# Patient Record
Sex: FEMALE | Race: WHITE
[De-identification: names, ages, dates, MRNs, and addresses within clinical notes are randomized per-mention and may not be internally consistent; named-entity substitution may affect disease eponyms.]

---
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
## 2019-03-25 ENCOUNTER — HOSPITAL ENCOUNTER (EMERGENCY)
Dept: HOSPITAL 74 - JER | Age: 19
Discharge: HOME | End: 2019-03-25
Payer: COMMERCIAL

## 2019-03-25 VITALS — SYSTOLIC BLOOD PRESSURE: 112 MMHG | DIASTOLIC BLOOD PRESSURE: 85 MMHG | HEART RATE: 106 BPM | TEMPERATURE: 98.1 F

## 2019-03-25 VITALS — BODY MASS INDEX: 30.4 KG/M2

## 2019-03-25 DIAGNOSIS — O23.42: ICD-10-CM

## 2019-03-25 DIAGNOSIS — O26.892: Primary | ICD-10-CM

## 2019-03-25 DIAGNOSIS — Z3A.15: ICD-10-CM

## 2019-03-25 LAB
ALBUMIN SERPL-MCNC: 3.2 G/DL (ref 3.4–5)
ALP SERPL-CCNC: 71 U/L (ref 45–117)
ALT SERPL-CCNC: 14 U/L (ref 13–61)
ANION GAP SERPL CALC-SCNC: 7 MMOL/L (ref 8–16)
APPEARANCE UR: (no result)
APTT BLD: 31.9 SECONDS (ref 25.2–36.5)
AST SERPL-CCNC: 11 U/L (ref 15–37)
BACTERIA #/AREA URNS HPF: 236.56 /HPF
BILIRUB SERPL-MCNC: 0.2 MG/DL (ref 0.2–1)
BILIRUB UR STRIP.AUTO-MCNC: NEGATIVE MG/DL
BUN SERPL-MCNC: 8 MG/DL (ref 7–18)
CALCIUM SERPL-MCNC: 8.5 MG/DL (ref 8.5–10.1)
CASTS #/AREA URNS LPF: 3 /HPF (ref 0–8)
CHLORIDE SERPL-SCNC: 108 MMOL/L (ref 98–107)
CO2 SERPL-SCNC: 22 MMOL/L (ref 21–32)
COLOR UR: YELLOW
CREAT SERPL-MCNC: 0.4 MG/DL (ref 0.55–1.3)
DEPRECATED RDW RBC AUTO: 13.3 % (ref 11.6–15.6)
EPITH CASTS URNS QL MICRO: 3.2 /HPF (ref 0–5)
GLUCOSE SERPL-MCNC: 82 MG/DL (ref 74–106)
HCT VFR BLD CALC: 35.4 % (ref 32.4–45.2)
HGB BLD-MCNC: 12.5 GM/DL (ref 10.7–15.3)
INR BLD: 1.03 (ref 0.83–1.09)
KETONES UR QL STRIP: (no result)
LEUKOCYTE ESTERASE UR QL STRIP.AUTO: (no result)
MCH RBC QN AUTO: 31.6 PG (ref 25.7–33.7)
MCHC RBC AUTO-ENTMCNC: 35.5 G/DL (ref 32–36)
MCV RBC: 89.2 FL (ref 80–96)
NITRITE UR QL STRIP: NEGATIVE
PH UR: 7 [PH] (ref 5–8)
PLATELET # BLD AUTO: 258 K/MM3 (ref 134–434)
PMV BLD: 7.8 FL (ref 7.5–11.1)
POTASSIUM SERPLBLD-SCNC: 3.8 MMOL/L (ref 3.5–5.1)
PROT SERPL-MCNC: 6.6 G/DL (ref 6.4–8.2)
PROT UR QL STRIP: NEGATIVE
PROT UR QL STRIP: NEGATIVE
PT PNL PPP: 12.2 SEC (ref 9.7–13)
RBC # BLD AUTO: 1 /HPF (ref 0–4)
RBC # BLD AUTO: 3.97 M/MM3 (ref 3.6–5.2)
SODIUM SERPL-SCNC: 137 MMOL/L (ref 136–145)
SP GR UR: 1.03 (ref 1.01–1.03)
UROBILINOGEN UR STRIP-MCNC: 1 MG/DL (ref 0.2–1)
WBC # BLD AUTO: 8.8 K/MM3 (ref 4–10)
WBC # UR AUTO: 5 /HPF (ref 0–5)

## 2019-03-25 NOTE — PDOC
History of Present Illness





- General


Chief Complaint: Pain


Stated Complaint: PAIN 16 WKS PREGNANT


Time Seen by Provider: 19 13:13





- History of Present Illness


Initial Comments: 





The pt is a 18  @ 15w5d by US, s/p C/S x1 who presents for evaluation of 4 

days of R flank/abdominal pain and LLQ abdominal pain. The R flank pain is 

constant, cramping, radiates to her back and mid-abdomen, and is not associated 

with vaginal bleeding/discharge, dysuria, or hematuria. She states she has a L 

ovarian cyst and the pain felt there similar to that of her previous pain 

related to her cyst.


Denies fevers/chills, HA, vision changes, chest pain, SOB, V/D/C, or blood in 

her stool.





She has yet to be able to establish OB care during this pregnancy yet.


19 13:47








Past History





- Past Medical History


Allergies/Adverse Reactions: 


 Allergies











Allergy/AdvReac Type Severity Reaction Status Date / Time


 


No Known Allergies Allergy   Verified 19 14:45











Home Medications: 


Ambulatory Orders





Prenatal Vit Calc,Iron,Folic [Prenatal Vitamins] 1 each PO DAILY 17 


Nitrofurantoin Monohyd/M-Cryst [Macrobid -] 100 mg PO BID #14 capsule 19 


Nitrofurantoin Monohyd/M-Cryst [Macrobid -] 100 mg PO BID #14 capsule 19 








Asthma: No


Cancer: No


Cardiac Disorders: No


COPD: No


Diabetes: No


HTN: No


Seizures: No


Thyroid Disease: No





- Reproductive History


 (#): 1


Para: 0


Therapeutic (s) & number: No





- Suicide/Smoking/Psychosocial Hx


Smoking History: Former smoker


Have you smoked in the past 12 months: Yes


Number of Cigarettes Smoked Daily: 2


Information on smoking cessation initiated: No


Hx Alcohol Use: No


Drug/Substance Use Hx: No


Substance Use Type: None


Hx Substance Use Treatment: No





**Review of Systems





- Review of Systems


Able to Perform ROS?: Yes


Comments:: 


GENERAL/CONSTITUTIONAL: No fever or chills. No weakness


HEAD, EYES, EARS, NOSE AND THROAT: No change in vision. No ear pain or 

discharge. No sore throat


CARDIOVASCULAR: No chest pain or shortness of breath


RESPIRATORY: Denies cough, hemoptysis


GENITOURINARY: No dysuria, frequency, or change in urination


MUSCULOSKELETAL: No joint or muscle swelling or pain. No neck or back pain


SKIN: No rash


NEUROLOGIC: No headache, vertigo, loss of consciousness, or change in strength/

sensation


ENDOCRINE: No increased thirst. No abnormal weight change


HEMATOLOGIC/LYMPHATIC: No anemia, easy bleeding, or history of blood clots


ALLERGIC/IMMUNOLOGIC: No hives or skin allergy








19 13:50





Is the patient limited English proficient: No





*Physical Exam





- Vital Signs


 Last Vital Signs











Temp Pulse Resp BP Pulse Ox


 


 98.1 F   106 H  20   112/85   99 


 


 19 12:25  19 12:25  19 12:25  19 12:25  19 12:25














- Physical Exam


Comments: 





GENERAL: Awake, alert, and oriented to person/place/time, in no acute distress


HEAD: No signs of trauma, normocephalic, atraumatic 


EYES: PERRLA, EOMI, sclera anicteric, conjunctiva clear


ENT: Hearing grossly normal, nares patent, oropharynx clear without exudates. 

Moist mucosa


LUNGS: No distress, speaks full sentences, clear to auscultation bilaterally


HEART: Regular rate and rhythm, normal S1 and S2, no murmurs appreciated, 

peripheral pulses normal and equal bilaterally


ABDOMEN: Soft, lower abdominal TTP w/o rebound or guarding; normoactive bowel 

sounds


EXTREMITIES: Normal inspection, Normal range of motion, no edema. No clubbing 

or cyanosis


NEUROLOGICAL: Cranial nerves II through XII grossly intact. Normal speech, no 

focal sensorimotor deficits


SKIN: Warm, Dry





External genitalia unremarkable


Speculum exam with normal appearing whitish vaginal discharge, no blood 

visualized


Vaginal wall mucosa is unremarkable


Cervix visualized


Bimanual exam without cervical motion tenderness, os closed, +R/L adenexal TTP


19 14:08








ED Treatment Course





- LABORATORY


CBC & Chemistry Diagram: 


 19 13:40





 19 13:25





- RADIOLOGY


Radiology Studies Ordered: 














 Category Date Time Status


 


 PREGNANCY LIMITED US [US] Stat Ultrasound  19 13:26 Ordered














Medical Decision Making





- Medical Decision Making





The pt is a 18F  at 15weeks who presents for evaluation of 4 days of 

cramping abdominal pain and concern for 'bleeing in the womb' after being 

evaluated 2 days ago in an ED in [place]





ED Course


CMP, CBC, Coags, T/S, Beta-quant


UA


Abdominal U/S to evaluate pregnancy, RUQ, and R flank


19 14:13





Pt noted small amount of spotting as pt was going to U/S


Will send UCx as pt w/ asymptomatic bacteriuria


19 15:57





Macrobid 100mg PO BID Rx sent to pt's pharmacy for bacteriuria in pregnancy


U/S's negative for acute pathology


Plan for D/C w/ OB f/u


Discharge instructions and return precautions given


Pt in agreement and verbalized understanding





Dispo: home


19 17:50








*DC/Admit/Observation/Transfer


Diagnosis at time of Disposition: 


 Abdominal pain affecting pregnancy





UTI (urinary tract infection) during pregnancy


Qualifiers:


 Trimester: first trimester Qualified Code(s): O23.41 - Unspecified infection 

of urinary tract in pregnancy, first trimester








- Discharge Dispostion


Disposition: HOME


Condition at time of disposition: Stable


Decision to Admit order: No





- Prescriptions


Prescriptions: 


Nitrofurantoin Monohyd/M-Cryst [Macrobid -] 100 mg PO BID #14 capsule


Nitrofurantoin Monohyd/M-Cryst [Macrobid -] 100 mg PO BID #14 capsule





- Referrals


Referrals: 


Taylor Colón MD [Staff Physician] - 


Reshma George MD [Staff Physician] - 





- Patient Instructions


Printed Discharge Instructions:  DI for Urinary Tract Infection (UTI), DI for 

Abdominal Pain -- Early Pregnancy


Additional Instructions: 


You were seen in the Emergency Department for evaluation of abdominal pain. 

Your urine was significant for bacteriuria and a prescription for Macrobid was 

sent to [facility]. Review the handout provided at discharge, follow up 

with the referral provided. Return the Emergency Department if you develop 

fevers/chills, worsening pain, vaginal bleeding/discharge, vomiting, worsening 

symptoms, or any new/concerning symptoms.





- Post Discharge Activity

## 2019-03-25 NOTE — PDOC
Attending Attestation





- Resident


Resident Name: Harjeet Lagos





- ED Attending Attestation


I have performed the following: I have examined & evaluated the patient, The 

case was reviewed & discussed with the resident, I agree w/resident's findings 

& plan





- HPI


HPI: 





19 14:46


Healthy 18-year-old female  at about 15 weeks gestation presents for 

further evaluation of abdominal pain. Pain began about 3 or 4 days ago, was 

seen at an emergency department in [place] 2 days ago and was told she had 

"bleeding in the womb" and was encouraged to follow up with OB/GYN. She denies 

any vaginal bleeding, reports abdominal cramping as high as the right upper 

quadrant, not associated with any fever/chills/vomiting/diarrhea. Denies any 

urinary complaints.





- Physicial Exam


PE: 





19 14:47


Afebrile, vital signs otherwise normal


Well-appearing, alert, obese


No jaundice or pallor


Heart is regular, lungs are clear


Abdomen is soft/nondistended. Palpable uterus above the pubic symphysis, 

nontender. Diffuse discomfort to palpation throughout the upper abdomen 

bilaterally with greatest tenderness appearing to be in the right upper quadrant

/right CVA area. No rash. Bowel sounds are normal.


Pelvic per resident.





- Medical Decision Making





19 14:48


18-year-old female second trimester pregnancy with 2-3 days of abdominal pain, 

diagnosed with some intrauterine bleed on ultrasound performed 2 days ago at 

outside ED, though exam localizes well above the uterus to the epigastric/right 

upper quadrant/right flank region. Question biliary disease, rule out gastritis 

or Gu etiology, will need to further identify intrauterine pathology. 


labs, ua


pregnancy u/s


ruq/r kidney u/s


reassess

## 2019-09-04 ENCOUNTER — HOSPITAL ENCOUNTER (INPATIENT)
Dept: HOSPITAL 74 - JLDR | Age: 19
LOS: 3 days | Discharge: HOME | DRG: 540 | End: 2019-09-07
Attending: STUDENT IN AN ORGANIZED HEALTH CARE EDUCATION/TRAINING PROGRAM | Admitting: STUDENT IN AN ORGANIZED HEALTH CARE EDUCATION/TRAINING PROGRAM
Payer: COMMERCIAL

## 2019-09-04 VITALS — BODY MASS INDEX: 32.5 KG/M2

## 2019-09-04 DIAGNOSIS — O34.211: Primary | ICD-10-CM

## 2019-09-04 DIAGNOSIS — Z3A.39: ICD-10-CM

## 2019-09-04 LAB
APTT BLD: 28.5 SECONDS (ref 25.2–36.5)
INR BLD: 0.88 (ref 0.83–1.09)
PT PNL PPP: 10.4 SEC (ref 9.7–13)

## 2019-09-04 RX ADMIN — IBUPROFEN PRN MG: 600 TABLET, FILM COATED ORAL at 15:13

## 2019-09-04 RX ADMIN — SIMETHICONE CHEW TAB 80 MG PRN MG: 80 TABLET ORAL at 15:13

## 2019-09-04 RX ADMIN — ACETAMINOPHEN PRN MG: 325 TABLET ORAL at 15:13

## 2019-09-04 NOTE — OP
DATE OF OPERATION:  2019

 

PREOPERATIVE DIAGNOSIS:  A 19-year-old  2, para 1 at 39 weeks' gestation,

prior  section x1, desiring repeat  section.  

 

POSTOPERATIVE DIAGNOSIS:  A 19-year-old  2, para 1 at 39 weeks' gestation
,

prior  section x1, desiring repeat  section.  

 

PROCEDURE:  Repeat low transverse  section.  

 

SURGEON:  Thiago Ruiz MD 

 

ASSISTANT:  SOURAV Miller

 

COMPLICATIONS:  None.  

 

ANESTHESIA:  Spinal.  

 

ESTIMATED BLOOD LOSS FOR THE PROCEDURE:  700 mL 

 

INTRAVENOUS FLUIDS:  Crystalloid 1200 mL. 

 

URINE OUTPUT:  Clear urine 150 mL. 

 

FINDINGS:  Low abdomen scar consistent with prior  section and moderate

amount of subcutaneous adipose tissue.  The fascia was slightly thickened and

slightly adherent to the underlying rectus muscles.  The rectus muscles were 
fused

in the midline.  No parietal peritoneal visceral adhesions noted.  The bladder 
was

slightly adherent to the lower uterine segment.  Infant in cephalic 
presentation. 

Clear amniotic fluid.  No nuchal cord present.  Bladder dome rectus muscle 
fascia

interface was noted to be dry with no evidence of active bleeding.  

 

PROCEDURE:  The patient was taken to the operating room where spinal anesthesia 
was

found to be adequate.  She was then prepped and draped in the normal sterile 
fashion.

 Mcknight catheter was placed atraumatically.  Appropriate timeout took place.  

 

A Pfannenstiel skin incision was made following the prior  section 
scar.  The

incision was carried to the underlying fascia with the Bovie.  The fascia was

dissected in the midline.  The incision was extended laterally with sharp 
dissection.

 The underlying rectus muscles were dissected off bluntly and sharply without

difficulty.  The rectus muscles were fused in the midline and they were 
elevated with

Allis clamps and transected sharply superiorly.  Entry into the peritoneal 
cavity

revealed no evidence of adhesions at the point of entry.  The incision was 
extended

superiorly and inferiorly without difficulty.  The incision was extended 
laterally

with blunt dissection.  Bladder blade was placed and the lower uterine segment 
was

noted not to be effaced.  The lower uterine segment transverse incision was 
made with

the scalpel and extended laterally with blunt dissection.  Amniotomy revealed 
clear

amniotic fluid.  Infant in cephalic presentation.  Infant was delivered through 
the

surgical incision with mild fundal pressure without difficulty.  Umbilical cord 
was

clamped and cut after delay.  Samples for blood were obtained.  The infant was 
handed

off to the awaiting NICU staff.  The placenta was delivered manually and 
intact.  The

uterus was exteriorized, and the intrauterine cavity was cleared of all clots 
and

debris.  Lower uterine segment incision was approximated with 1.0 Polysorb 
running locked

sutures, with excellent structure approximation and hemostasis achieved.  Uterus
,

bilateral tubes and ovaries were consistent with normal anatomy.  The uterus was

internalized to the pelvic cavity and the gutters were cleared off all clots and

debris.  Inspection of the lower uterine segment incision revealed again with

excellent hemostasis.  Bladder dome and rectus muscle interface inspected and 
the

findings are documented above.  The fascial incision was approximated with 0 
Polysorb

sutures in a running non-locked manner.  Excellent approximation achieved and

confirmed by digital palpation by the surgeon.  Subcutaneous tissues were 
copiously

irrigated and bleeders neutralized with Bovie cautery.  Subcutaneous tissues 
were

approximated with 0 chromic subcutaneous sutures.  The skin incision was

reapproximated with surgical staples.  Excellent hemostasis at the end of the

procedure.  Patient tolerated the procedure well and the instrument counts were

reported as correct x2 by the staff.  

 

 

THIAGO RUIZ MD [id number]

DD: 2019 11:09

DT: 2019 15:14

Job #:  [id number]

MAXX

## 2019-09-04 NOTE — HP
Past Medical History





- Primary Care Physician


PCP:: Jj Ocasio





- Admission


History Source: Patient


Limitations to Obtaining History: No Limitations





- Past Medical History


CNS: No: Alzheimer's, CVA, Dementia, Migraine, Multiple Sclerosis, Peripheral 

Neuropathy, Parkinson's, Seizure, Syncope, TIA, Vertigo, Other


Cardiovascular: No: AFIB, Aneurysm, Aortic Insufficiency, Aortic Stenosis, CAD, 

CHF, Deep Vein Thrombosis, HTN, Hyperlipdemia, MI, Mitral Insufficiency, Mitral 

Stenosis, Murmur, Pulmonary Hypertension, Other


Pulmonary: No: Asthma, Bronchitis, Cancer, COPD, O2 Dependent, Pneumonia, 

Previously Intubated, Pulmonary Embolus, Pulmonary Fibrosis, Sleep Apnea, Other


Gastrointestinal: No: Ascites, Cancer, Constipation, Crohn's Disease, 

Diverticulitis, Diverticulosis, Esophageal Varices, Gastritis, GERD, GI Bleed, 

Hemorrhoids, Hiatal Hernia, Inflamatory Bowel Disease, Irritable Bowel Disease, 

Pancreatitis, Peptic Ulcer Disease, Ulcerative Colitis, Other


Hepatobiliary: No: Cirrhosis, Cholelithiasis, Cholecystitis, Choledocholithiasis

, Hepatitis A, Hepatitis B, Hepatitis C, Other


Reproductive: No: Ectopic Pregnancy, Endometriosis, Fibroids, PID, Polycystic 

Ovary Syndrome, Postmenopausal, Other


...: 2


...Para: 1


...Term: 1


...: 0


...Spon : 0


...Induced : 0


...Multiple Gestation: 0


...LMP: 18


... Weeks Gestation by Dates: 39


...EDC by Dates: 19


...EDC by Sono: 09/15/19


Heme/Onc: No: Anemia, B12 Deficiency, Bleeding Disorder, Cancer, Current 

Chemotherapy, Current Radiation Therapy, Hemochromatosis, Hypercoaguable State, 

Myeloproliferative Synd, Sickle Cell Disease, Sickle Cell Trait, 

Thrombocytopenia, Other


Infectious Disease: No: AIDS, C-Diff, Herpes Zoster, HIV, MRSA, STD's, 

Tuberculosis, VREF, Other


Psych: No: Addictions, Anxiety, Bipolar, Depression, Panic, Psychosis, 

Schizophrenia, Other


Musculoskeletal: No: Bursitis, Chronic low back pain, Hemiparesis, Hemiplegia, 

Osteoarthritis, Paraplegia, Other


Rheumatology: No: Fibromyalgia, Gout, Lupus, Rheumatoid Arthritis, Sarcoidosis, 

Vasculitis, Other


ENT: No: Allergic Rhinitis, Sinusitis, Other


Endocrine: No: Marco's Disease, Cushing's Disease, Diabetes Insipidus, 

Diabetes Mellitus, Hyperparathyroidism, Hyperthyroidism, Hypothyroidism, 

Osteopenia, SIADH, Other


Dermatology: No: Basal Cell, Cellulitis, Eczema, Melanoma, Psoriasis, Squamous 

Cell, Other





- Past Surgical History


Past Surgical History: No: None, AAA Repair, AICD, Amputation, Appendectomy, 

Arthrosocopy, AV Fistula/Graft, Bariatric Surgery, Breast Biopsy, Bypass, CABG, 

Carotid Endarterectomy, Cataract Removal, Cholecystectomy, Colectomy, 

Colonoscopy, Colostomy, Craniotomy, , Cystectomy, Hernia Repair, 

Hysterectomy, Ileal Conduit, Ileosotomy, Joint Replacement, Kidney Transplant, 

Laminectomy, Liver Transplant, Mastectomy, Nephrectomy, Oopherectomy, 

Orchiectomy, Permanent Pacemaker, Prostatectomy, Splenectomy, Stent, Thoracotomy

, TURP, Tonsillectomy, Tubal Ligation, Upper Endoscopy, Valve Replacement, 

Vasectomy, Vein Stripping/Ligation


Hx Myomectomy: No


Hx Transabdominal Cerclage: No


Additional Surgical History: Previous C/S





- Smoking History


Smoking history: Never smoked


Have you smoked in the past 12 months: No


Aproximately how many cigarettes per day: 2





- Alcohol/Substance Use


Hx Alcohol Use: No


History of Substance Use: reports: None





- Social History


History of Recent Travel: No





Home Medications





- Allergies


Allergies/Adverse Reactions: 


 Allergies











Allergy/AdvReac Type Severity Reaction Status Date / Time


 


No Known Allergies Allergy   Verified 19 14:45














- Home Medications


Home Medications: 


Ambulatory Orders





Prenatal Vit Calc,Iron,Folic [Prenatal Vitamins] 1 each PO DAILY 17 











Family Disease History





- Family Disease History


Family History: Unremarkable





Review of Systems





- Review of Systems


Constitutional: reports: No Symptoms


Eyes: reports: No Symptoms


HENT: reports: No Symptoms


Neck: reports: No Symptoms


Cardiovascular: reports: No Symptoms


Respiratory: reports: No Symptoms, Other (nasal congestion)


Gastrointestinal: reports: No Symptoms


Genitourinary: reports: No Symptoms


Breasts: reports: Other


Musculoskeletal: reports: No Symptoms


Integumentary: reports: No Symptoms


Neurological: reports: No Symptoms


Endocrine: reports: No Symptoms


Hematology/Lymphatic: reports: No Symptoms


Psychiatric: reports: No Symptoms





Physical Exam - Maternity


Vital Signs: 


 Vital Signs











Temperature  97.7 F   19 07:44


 


Pulse Rate  95 H  19 07:44


 


Respiratory Rate  20   19 07:44


 


Blood Pressure  122/75   19 07:44


 


O2 Sat by Pulse Oximetry (%)      











Constitutional: Yes: Well Nourished


HENT: Yes: Atraumatic, Normocephalic


Neck: Yes: Supple, Tenderness


Breast(s): Yes: Other (deferred)





- Abdominal Exam/OB


Category: I


Accelerations: Uniform


Decelerations: None





- Vaginal Exam/OB


Vaginal Bleediing: No





- Physical Exam


Musculoskeletal: Yes: WNL


Edema: LLE: 1+, RLE: 1+


Deep Tendon Reflex Grade: Normal +2


...Motor Strength: WNL


Psychiatric: Yes: Alert, Oriented





- Labs


Lab Results: 





reviewed





Imaging





- Results


Ultrasound: Report Reviewed





Assessment/Plan





20 y/o @ 39wks gestation, prior C/S x1 presenting for scheduled RCS, risks and 

complications of procedure discussed and all questions answered. Informed 

consent obtained


-Proceed with surgery

## 2019-09-05 LAB
BASOPHILS # BLD: 0.3 % (ref 0–2)
DEPRECATED RDW RBC AUTO: 13.6 % (ref 11.6–15.6)
EOSINOPHIL # BLD: 1.7 % (ref 0–4.5)
HCT VFR BLD CALC: 31.9 % (ref 32.4–45.2)
HGB BLD-MCNC: 10.6 GM/DL (ref 10.7–15.3)
LYMPHOCYTES # BLD: 14 % (ref 8–40)
MCH RBC QN AUTO: 28.9 PG (ref 25.7–33.7)
MCHC RBC AUTO-ENTMCNC: 33.3 G/DL (ref 32–36)
MCV RBC: 86.8 FL (ref 80–96)
MONOCYTES # BLD AUTO: 6.6 % (ref 3.8–10.2)
NEUTROPHILS # BLD: 77.4 % (ref 42.8–82.8)
PLATELET # BLD AUTO: 225 K/MM3 (ref 134–434)
PMV BLD: 8.3 FL (ref 7.5–11.1)
RBC # BLD AUTO: 3.68 M/MM3 (ref 3.6–5.2)
WBC # BLD AUTO: 14.2 K/MM3 (ref 4–10)

## 2019-09-05 RX ADMIN — ACETAMINOPHEN PRN MG: 325 TABLET ORAL at 07:50

## 2019-09-05 RX ADMIN — DIPHENHYDRAMINE HCL PRN MG: 25 CAPSULE ORAL at 22:40

## 2019-09-05 RX ADMIN — IBUPROFEN PRN MG: 600 TABLET, FILM COATED ORAL at 03:35

## 2019-09-05 RX ADMIN — SIMETHICONE CHEW TAB 80 MG PRN MG: 80 TABLET ORAL at 03:34

## 2019-09-05 RX ADMIN — IBUPROFEN PRN MG: 600 TABLET, FILM COATED ORAL at 17:09

## 2019-09-05 RX ADMIN — ACETAMINOPHEN PRN MG: 325 TABLET ORAL at 03:34

## 2019-09-05 RX ADMIN — IBUPROFEN PRN MG: 600 TABLET, FILM COATED ORAL at 07:49

## 2019-09-05 RX ADMIN — SIMETHICONE CHEW TAB 80 MG PRN MG: 80 TABLET ORAL at 12:05

## 2019-09-05 RX ADMIN — SIMETHICONE CHEW TAB 80 MG PRN MG: 80 TABLET ORAL at 07:49

## 2019-09-05 RX ADMIN — ACETAMINOPHEN PRN MG: 325 TABLET ORAL at 17:09

## 2019-09-05 RX ADMIN — SIMETHICONE CHEW TAB 80 MG PRN MG: 80 TABLET ORAL at 19:55

## 2019-09-05 NOTE — PN
Progress Note (short form)





- Note


Progress Note: 





S: no c/o


O: VAS 3/10


A/P: POD 1 s/p C/S. cont pain med as ordered. cont current care. no anesth comps

## 2019-09-05 NOTE — PN
Post Partum Progress Note





- Subjective


Subjective: 





Ambulating, day is out and s/p voiding trial, lochia decreased, bottle feeding


Post Partum Day: 1


Type of Delivery: Repeat C/S


Vital Signs: 


 Vital Signs











Temperature  98.0 F   09/05/19 02:00


 


Pulse Rate  80   09/05/19 02:00


 


Respiratory Rate  18   09/05/19 07:00


 


Blood Pressure  120/70   09/05/19 02:00


 


O2 Sat by Pulse Oximetry (%)  100   09/04/19 11:15











Breast Exam: Yes: Other (deferred)


Uterus: Yes: Fundus Firm


Incision: Yes: Staples intact (dressing removed and incision intact with 

staples in place)


Abdomen/GI: Yes: Abdomen soft


Lochia, amount: Small


Extremities: Yes: Calves non-tender


Activity: Ambulating





- Labs


Labs: 





pending in AM





Assessment/Plan





POD # 1 S/P uncomplicated LTRCS, dressing removed, PP/post-op precautions 

discussed


-Continue PP/post-op care


-F/U AM CBC


-encourage ambulation

## 2019-09-06 RX ADMIN — SIMETHICONE CHEW TAB 80 MG PRN MG: 80 TABLET ORAL at 20:47

## 2019-09-06 RX ADMIN — IBUPROFEN PRN MG: 600 TABLET, FILM COATED ORAL at 20:49

## 2019-09-06 RX ADMIN — DIPHENHYDRAMINE HCL PRN MG: 25 CAPSULE ORAL at 23:35

## 2019-09-06 RX ADMIN — DIPHENHYDRAMINE HCL PRN MG: 25 CAPSULE ORAL at 12:58

## 2019-09-06 RX ADMIN — IBUPROFEN PRN MG: 600 TABLET, FILM COATED ORAL at 08:04

## 2019-09-06 RX ADMIN — IBUPROFEN PRN MG: 600 TABLET, FILM COATED ORAL at 13:05

## 2019-09-06 RX ADMIN — ACETAMINOPHEN PRN MG: 325 TABLET ORAL at 01:02

## 2019-09-06 RX ADMIN — SIMETHICONE CHEW TAB 80 MG PRN MG: 80 TABLET ORAL at 13:04

## 2019-09-06 RX ADMIN — SIMETHICONE CHEW TAB 80 MG PRN MG: 80 TABLET ORAL at 00:02

## 2019-09-06 RX ADMIN — SIMETHICONE CHEW TAB 80 MG PRN MG: 80 TABLET ORAL at 08:04

## 2019-09-06 RX ADMIN — IBUPROFEN PRN MG: 600 TABLET, FILM COATED ORAL at 00:03

## 2019-09-06 NOTE — PATH
Surgical Pathology Report



Patient Name:  PAM VIDALES

Accession #:  [id number]

Med. Rec. #:  [id number]                                                        

   /Age/Gender:  2000 (Age: 19) / F

Account:  [id number]                                                          

             Location: [facility] OBS/GYN

Taken:  2019

Received:  2019

Reported:  2019

Physicians:  Jj Ocasio MD

  



Specimen(s) Received

 PLACENTA 





Clinical History

, 39 weeks, previous 







Final Diagnosis

PLACENTA,  SECTION:

593 G THIRD TRIMESTER PLACENTA WITH TRIVASCULAR UMBILICAL CORD AND UNREMARKABLE

PLACENTAL MEMBRANES.





***Electronically Signed***

Magaly Deras M.D.





Gross Description

The specimen is received fresh labeled placenta and is a 593 gram, 20 x 19 x 1.5

cm. placenta with attached membranes and umbilical cord.  The attached membranes

are clear and translucent and insert marginally.  The umbilical cord measures 30

cm. in length and averages 1.5 cm. in diameter.  The cord inserts eccentrically,

4 cm. to the nearest margin.  No true knots or strictures are identified. Cut

surface of the umbilical cord reveals 3 vessels. The fetal surface is grey-blue

with minimal fibrin deposition and appropriate caliber vessels.  The maternal

surface is red-brown with focal defects.  Sectioning reveals red-brown, spongy

parenchyma.  No lesions are identified.  Representative sections are submitted

in three cassettes as follows: 1- membrane rolls and umbilical cord; 2-3- full

thickness sections of placenta.

MLSZ/2019



sanml/[email or web address]

## 2019-09-06 NOTE — PN
Progress Note (short form)





- Note


Progress Note: 





pod 2 s/p c/s , c/o cramps, no excess vaginal bleeding, passing gas


 CBC, BMP





 09/05/19 07:15 





 Last Vital Signs











Temp Pulse Resp BP Pulse Ox


 


 98.6 F   98 H  18   121/55 L  100 


 


 09/05/19 22:00  09/05/19 22:00  09/05/19 22:00  09/05/19 22:00  09/04/19 11:15








abdomen soft, no distension , no cva 


uterus firm, non tender 


lochia mild 


no calf tenderness 


impression pod 2 ,doing well


plan ambulate , pain management


cbc in am

## 2019-09-07 VITALS — HEART RATE: 95 BPM | SYSTOLIC BLOOD PRESSURE: 129 MMHG | DIASTOLIC BLOOD PRESSURE: 60 MMHG | TEMPERATURE: 97.7 F

## 2019-09-07 LAB
BASOPHILS # BLD: 0.3 % (ref 0–2)
DEPRECATED RDW RBC AUTO: 14 % (ref 11.6–15.6)
EOSINOPHIL # BLD: 4.7 % (ref 0–4.5)
HCT VFR BLD CALC: 30.3 % (ref 32.4–45.2)
HGB BLD-MCNC: 10.2 GM/DL (ref 10.7–15.3)
LYMPHOCYTES # BLD: 27.7 % (ref 8–40)
MCH RBC QN AUTO: 29.3 PG (ref 25.7–33.7)
MCHC RBC AUTO-ENTMCNC: 33.7 G/DL (ref 32–36)
MCV RBC: 86.8 FL (ref 80–96)
MONOCYTES # BLD AUTO: 7.9 % (ref 3.8–10.2)
NEUTROPHILS # BLD: 59.4 % (ref 42.8–82.8)
PLATELET # BLD AUTO: 253 K/MM3 (ref 134–434)
PMV BLD: 8.1 FL (ref 7.5–11.1)
RBC # BLD AUTO: 3.49 M/MM3 (ref 3.6–5.2)
WBC # BLD AUTO: 9.1 K/MM3 (ref 4–10)

## 2019-09-07 RX ADMIN — IBUPROFEN PRN MG: 600 TABLET, FILM COATED ORAL at 02:17

## 2019-09-07 RX ADMIN — ACETAMINOPHEN PRN MG: 325 TABLET ORAL at 07:43

## 2019-09-07 RX ADMIN — IBUPROFEN PRN MG: 600 TABLET, FILM COATED ORAL at 07:40

## 2019-09-07 RX ADMIN — SIMETHICONE CHEW TAB 80 MG PRN MG: 80 TABLET ORAL at 02:17

## 2019-09-07 RX ADMIN — SIMETHICONE CHEW TAB 80 MG PRN MG: 80 TABLET ORAL at 07:44

## 2019-09-07 NOTE — DS
Physical Examination


Vital Signs: 


 Vital Signs











Temperature  97.7 F   09/07/19 08:01


 


Pulse Rate  95 H  09/07/19 08:01


 


Respiratory Rate  20   09/07/19 08:01


 


Blood Pressure  129/60   09/07/19 08:01


 


O2 Sat by Pulse Oximetry (%)  100   09/04/19 11:15











Findings/Remarks: 





Patient is doing well, ambulating, tolerating PO, lochia decreased, bottle 

feeding. PP/post-op precautions discussed and all questions answered


Constitutional: Yes: Calm


HENT: Yes: Atraumatic, Normocephalic


Neck: Yes: Supple


Cardiovascular: Yes: Regular Rate and Rhythm


Respiratory: Yes: Regular


Gastrointestinal: Yes: Soft


...Rectal Exam: Yes: Deferred


Breast(s): Yes: Other (deferred)


Extremities: Yes: WNL


Edema: Yes


Edema: LLE: Trace, RLE: Trace


Integumentary: Yes: WNL


Wound/Incision: Yes: Well Approximated, Staples Intact (no evidence of infection

)


Neurological: Yes: Oriented


...Motor Strength: WNL


Psychiatric: Yes: Alert, Oriented


Labs: 


 CBC, BMP





 09/07/19 06:32 











Discharge Summary


Reason For Visit: C SECTION





uncomplicated delivery


Procedures: Principal: [facility]


Hospital Course: 





uncomplicated surgery and recovery


Condition: Stable





- Instructions


Diet, Activity, Other Instructions: 


Please return to regular diet and activity as tolerated. Follow up within 1 

week for incision check. Call MD with any questions or concerns


Referrals: 


Jj Ocasio MD [Staff Physician] - 


Disposition: HOME





- Home Medications


Comprehensive Discharge Medication List: 


Ambulatory Orders





Prenatal Vit Calc,Iron,Folic [Prenatal Vitamins] 1 each PO DAILY 08/13/17

## 2020-11-16 ENCOUNTER — HOSPITAL ENCOUNTER (EMERGENCY)
Dept: HOSPITAL 74 - JER | Age: 20
Discharge: HOME | End: 2020-11-16
Payer: COMMERCIAL

## 2020-11-16 VITALS — HEART RATE: 115 BPM | SYSTOLIC BLOOD PRESSURE: 130 MMHG | DIASTOLIC BLOOD PRESSURE: 76 MMHG | TEMPERATURE: 97.9 F

## 2020-11-16 VITALS — BODY MASS INDEX: 24.3 KG/M2

## 2020-11-16 DIAGNOSIS — O03.9: Primary | ICD-10-CM

## 2023-10-21 NOTE — OP
Operative Note





- Note:


Operative Date: 09/04/19 (Dic#[id number])


Pre-Operative Diagnosis: Prior C/S


Operation: RTLCS


Findings: 





see dictation


Post-Operative Diagnosis: Same as Pre-op


Surgeon: Jj Ocasio


Anesthesia: Spinal


Estimated Blood Loss (mls): 150


Drains, Volume Out (mls): 150


Fluid Volume Replaced (mls): 1,200


Operative Report Dictated: Yes PAST MEDICAL HISTORY:  Marysol-Danlos disease     Endometriosis     Fibromyalgia     GERD (gastroesophageal reflux disease)

## 2024-01-04 NOTE — PDOC
Patient Follow-up (Call Back)





- Post ED Follow - Up


Condition at time of discharge: Stable


Disposition at time of original discharge: HOME


Reason for Call Back: Radiology (Spoke to pt and made aware of US findings, 

states she continues to have some bleeding but not severe and no abdominal pain

, nausea, vomiting, fever, chills, or dysuria.  States she has upcoming GYN 

appointment and will follow-up.  Reasons to return to the ER discussed with 

patient) [Mother] : mother [Father] : father